# Patient Record
Sex: MALE | Race: BLACK OR AFRICAN AMERICAN | NOT HISPANIC OR LATINO | Employment: OTHER | URBAN - METROPOLITAN AREA
[De-identification: names, ages, dates, MRNs, and addresses within clinical notes are randomized per-mention and may not be internally consistent; named-entity substitution may affect disease eponyms.]

---

## 2020-01-22 PROBLEM — E78.5 HYPERLIPIDEMIA: Status: ACTIVE | Noted: 2020-01-22

## 2020-01-22 PROBLEM — E11.9 TYPE 2 DIABETES MELLITUS WITHOUT COMPLICATION, WITHOUT LONG-TERM CURRENT USE OF INSULIN: Status: ACTIVE | Noted: 2020-01-22

## 2020-01-22 PROBLEM — I10 HYPERTENSION: Status: ACTIVE | Noted: 2020-01-22

## 2020-06-22 PROBLEM — H40.1113 PRIMARY OPEN ANGLE GLAUCOMA OF RIGHT EYE, SEVERE STAGE: Status: ACTIVE | Noted: 2020-06-22

## 2020-06-22 PROBLEM — H40.1122 PRIMARY OPEN ANGLE GLAUCOMA OF LEFT EYE, MODERATE STAGE: Status: ACTIVE | Noted: 2020-06-22

## 2022-03-15 PROBLEM — I44.2 COMPLETE HEART BLOCK: Status: ACTIVE | Noted: 2022-03-15

## 2022-05-04 PROBLEM — Z95.0 CARDIAC PACEMAKER IN SITU: Status: ACTIVE | Noted: 2022-05-04

## 2023-04-19 DIAGNOSIS — E11.9 TYPE 2 DIABETES MELLITUS WITHOUT COMPLICATION: ICD-10-CM

## 2024-04-04 ENCOUNTER — PATIENT OUTREACH (OUTPATIENT)
Dept: ADMINISTRATIVE | Facility: OTHER | Age: 74
End: 2024-04-04

## 2024-04-04 NOTE — PROGRESS NOTES
CHW - Initial Contact    This Community Health Worker completed  the Social Determinant of Health questionnaire with patient during clinic visit today.    Pt identified barriers of most importance are: patient identified no barriers of importance during clinic visit    Referrals to community agencies completed with patient consent outside of [facility] include: no community referral is needed during clinic visit   Referrals were put through [facility] - no:   Support and Services: No support services is needed during clinic visit   Other information discussed the patient needs  help with: patient discussed no other information during clinic visi t   Follow up required: Yes  Follow-up Outreach - Due: 4/18/2024

## 2024-04-24 ENCOUNTER — PATIENT OUTREACH (OUTPATIENT)
Dept: ADMINISTRATIVE | Facility: OTHER | Age: 74
End: 2024-04-24

## 2024-04-24 NOTE — PROGRESS NOTES
CHW - Outreach Attempt    Community Health Worker left a voicemail message for 1st attempt to contact patient regarding: follow up  Phone interview  Community Health Worker to attempt to contact patient on: 04/24/2024

## 2024-04-30 ENCOUNTER — PATIENT OUTREACH (OUTPATIENT)
Dept: ADMINISTRATIVE | Facility: OTHER | Age: 74
End: 2024-04-30

## 2024-04-30 NOTE — PROGRESS NOTES
CHW - Follow Up    This Community Health Worker completed a follow up visit with patient via telephone today.  Pt reported: patient reported he is doing fine no assistance is needed during follow up phone interview.   Patient will reach out if assistance is needed in the future, patient also reminded of up coming appointments.   Community Health Worker provided: CHW spoke with patient he is doing fine  Follow up required:No    No future outreach task assigned                     CHW - Case Closure    This Community Health Worker spoke to patient via telephone today.   Pt reported:  patient reported he is doing fine no assistance is needed during follow up phone interview.   Patient will reach out if assistance is needed in the future, patient also reminded of up coming appointments.   Pt denied any additional needs at this time and agrees with episode closure at this time.    Provided patient with Community Health Worker's contact information and encouraged   him to contact this Community Health Worker if additional needs arise.

## 2024-09-23 DIAGNOSIS — E11.9 TYPE 2 DIABETES MELLITUS WITHOUT COMPLICATION: ICD-10-CM

## 2024-09-25 ENCOUNTER — PATIENT MESSAGE (OUTPATIENT)
Dept: ADMINISTRATIVE | Facility: HOSPITAL | Age: 74
End: 2024-09-25